# Patient Record
Sex: MALE | Race: BLACK OR AFRICAN AMERICAN | Employment: UNEMPLOYED | URBAN - METROPOLITAN AREA
[De-identification: names, ages, dates, MRNs, and addresses within clinical notes are randomized per-mention and may not be internally consistent; named-entity substitution may affect disease eponyms.]

---

## 2022-01-26 ENCOUNTER — APPOINTMENT (OUTPATIENT)
Dept: GENERAL RADIOLOGY | Age: 32
End: 2022-01-26
Attending: PHYSICIAN ASSISTANT
Payer: MEDICAID

## 2022-01-26 ENCOUNTER — HOSPITAL ENCOUNTER (EMERGENCY)
Age: 32
Discharge: HOME | End: 2022-01-27
Attending: EMERGENCY MEDICINE
Payer: MEDICAID

## 2022-01-26 VITALS
DIASTOLIC BLOOD PRESSURE: 85 MMHG | BODY MASS INDEX: 25.49 KG/M2 | OXYGEN SATURATION: 99 % | HEIGHT: 75 IN | WEIGHT: 205 LBS | RESPIRATION RATE: 20 BRPM | SYSTOLIC BLOOD PRESSURE: 132 MMHG | HEART RATE: 100 BPM | TEMPERATURE: 99 F

## 2022-01-26 DIAGNOSIS — Z87.39 HISTORY OF LOW BACK PAIN: ICD-10-CM

## 2022-01-26 DIAGNOSIS — F17.200 TOBACCO DEPENDENCE: ICD-10-CM

## 2022-01-26 DIAGNOSIS — M54.50 ACUTE BILATERAL LOW BACK PAIN WITHOUT SCIATICA: Primary | ICD-10-CM

## 2022-01-26 PROCEDURE — 74011250637 HC RX REV CODE- 250/637: Performed by: PHYSICIAN ASSISTANT

## 2022-01-26 PROCEDURE — 99283 EMERGENCY DEPT VISIT LOW MDM: CPT

## 2022-01-26 PROCEDURE — 72100 X-RAY EXAM L-S SPINE 2/3 VWS: CPT

## 2022-01-26 RX ORDER — PREDNISONE 10 MG/1
TABLET ORAL
Qty: 21 TABLET | Refills: 0 | Status: SHIPPED | OUTPATIENT
Start: 2022-01-26

## 2022-01-26 RX ORDER — MELOXICAM 15 MG/1
15 TABLET ORAL DAILY
Qty: 10 TABLET | Refills: 0 | Status: SHIPPED | OUTPATIENT
Start: 2022-01-26 | End: 2022-02-05

## 2022-01-26 RX ORDER — METHOCARBAMOL 750 MG/1
1500 TABLET, FILM COATED ORAL 3 TIMES DAILY
Qty: 24 TABLET | Refills: 0 | Status: SHIPPED | OUTPATIENT
Start: 2022-01-26 | End: 2022-01-30

## 2022-01-26 RX ORDER — OXYCODONE AND ACETAMINOPHEN 5; 325 MG/1; MG/1
1 TABLET ORAL
Status: COMPLETED | OUTPATIENT
Start: 2022-01-26 | End: 2022-01-26

## 2022-01-26 RX ORDER — OXYCODONE HYDROCHLORIDE 5 MG/1
5 TABLET ORAL
Qty: 12 TABLET | Refills: 0 | Status: SHIPPED | OUTPATIENT
Start: 2022-01-26 | End: 2022-01-29

## 2022-01-26 RX ORDER — DIAZEPAM 5 MG/1
5 TABLET ORAL
Status: COMPLETED | OUTPATIENT
Start: 2022-01-26 | End: 2022-01-26

## 2022-01-26 RX ADMIN — DIAZEPAM 5 MG: 5 TABLET ORAL at 23:49

## 2022-01-26 RX ADMIN — OXYCODONE HYDROCHLORIDE AND ACETAMINOPHEN 1 TABLET: 5; 325 TABLET ORAL at 23:49

## 2022-01-26 NOTE — Clinical Note
[address] EMERGENCY DEPT  [address] [facility] [address] [phone]    Work/School Note    Date: 1/26/2022    To Whom It May concern:    Kaila Zamora was seen and treated today in the emergency room by the following provider(s):  Attending Provider: Rinku Yang MD  Physician Assistant: Jeff Ndiaye. Kaila Zamora is excused from work/school on 1/26/2022 through 1/28/2022. He is medically clear to return to work/school on 1/29/2022.          Sincerely,          Jeff Levi

## 2022-01-27 ENCOUNTER — APPOINTMENT (OUTPATIENT)
Dept: GENERAL RADIOLOGY | Age: 32
End: 2022-01-27
Attending: PHYSICIAN ASSISTANT
Payer: MEDICAID

## 2022-01-27 PROCEDURE — 72220 X-RAY EXAM SACRUM TAILBONE: CPT

## 2022-01-27 NOTE — ED PROVIDER NOTES
EMERGENCY DEPARTMENT HISTORY AND PHYSICAL EXAM      Date: 1/26/2022  Patient Name: Jordan Luu    History of Presenting Illness     Chief Complaint   Patient presents with    Back Pain       History Provided By: Patient    HPI: Jordan Luu, 33 y.o. male presents ambulatory to the Emergency Dept with c/o low back pain since this morning. 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 He reports he has a remote h/o low back pain but has not had any issues until today. He denied rash/lesion. No dysuria/hematuria. No constipation/straining. He states the pain is right above the sacrum. He denied numbness/tingling/weakness but reportedly cannot get comfortable. He has no h/o kidney stones. He's had no incontinence of bowel/bladder. No constipation/straining. He rates his pain as 10/10 and describes it as a sharp, shooting pain. Pt is a smoker. Pt is o/w healthy without fever, chills, cough, congestion, ST, shortness of breath, chest pain, N/V/D. There are no other complaints, changes, or physical findings at this time. PCP: No primary care provider on file. Current Facility-Administered Medications   Medication Dose Route Frequency Provider Last Rate Last Admin    diazePAM (VALIUM) tablet 5 mg  5 mg Oral NOW [facility], [address]        oxyCODONE-acetaminophen (PERCOCET) 5-325 mg per tablet 1 Tablet  1 Tablet Oral NOW [facility], [address]         Current Outpatient Medications   Medication Sig Dispense Refill    oxyCODONE IR (Roxicodone) 5 mg immediate release tablet Take 1 Tablet by mouth every six (6) hours as needed for Pain for up to 3 days. Max Daily Amount: 20 mg. 12 Tablet 0    predniSONE (STERAPRED DS) 10 mg dose pack Take as directed 21 Tablet 0    methocarbamoL (ROBAXIN) 750 mg tablet Take 2 Tablets by mouth three (3) times daily for 4 days. 24 Tablet 0    meloxicam (MOBIC) 15 mg tablet Take 1 Tablet by mouth daily for 10 days.  10 Tablet 0       Past History     Past Medical History:  History reviewed. No pertinent past medical history. Past Surgical History:  History reviewed. No pertinent surgical history. Family History:  History reviewed. No pertinent family history. Social History:  Social History     Tobacco Use    Smoking status: Current Some Day Smoker     Packs/day: 0.25    Smokeless tobacco: Never Used    Tobacco comment: smokes black/milds   Substance Use Topics    Alcohol use: Not Currently    Drug use: Not Currently       Allergies:  No Known Allergies      Review of Systems   Review of Systems   Constitutional: Negative for chills and fever. HENT: Negative for congestion, rhinorrhea and sore throat. Respiratory: Negative for cough and shortness of breath. Cardiovascular: Negative for chest pain and palpitations. Gastrointestinal: Negative for abdominal pain, blood in stool, constipation, diarrhea, nausea and vomiting. Endocrine: Negative for polydipsia, polyphagia and polyuria. Genitourinary: Negative for decreased urine volume, difficulty urinating, dysuria, flank pain and hematuria. Musculoskeletal: Positive for back pain. Negative for neck pain and neck stiffness. Skin: Negative for color change, pallor, rash and wound. Allergic/Immunologic: Negative for food allergies and immunocompromised state. Neurological: Negative for dizziness, weakness, numbness and headaches. Hematological: Negative for adenopathy. Does not bruise/bleed easily. Psychiatric/Behavioral: Negative for agitation and confusion. All other systems reviewed and are negative. Physical Exam   Physical Exam  Vitals and nursing note reviewed. Constitutional:       General: He is in acute distress. Appearance: Normal appearance. He is well-developed and normal weight. He is not ill-appearing, toxic-appearing or diaphoretic. Comments: Pt sitting EOB, moaning in discomfort, alert   HENT:      Head: Normocephalic and atraumatic.       Nose: Nose normal. No congestion or rhinorrhea. Eyes:      General: No scleral icterus. Right eye: No discharge. Left eye: No discharge. Conjunctiva/sclera: Conjunctivae normal.   Neck:      Thyroid: No thyromegaly. Vascular: No JVD. Trachea: No tracheal deviation. Cardiovascular:      Rate and Rhythm: Normal rate and regular rhythm. Pulses: Normal pulses. Heart sounds: Normal heart sounds. Pulmonary:      Effort: Pulmonary effort is normal. No respiratory distress. Breath sounds: Normal breath sounds. No wheezing. Abdominal:      General: Abdomen is flat. Palpations: Abdomen is soft. Tenderness: There is no abdominal tenderness. There is no right CVA tenderness, left CVA tenderness, guarding or rebound. Musculoskeletal:         General: Tenderness present. No deformity. Cervical back: Normal range of motion and neck supple. Comments: Decreased A/P ROM to lumbosacral paraspinal musculature bilat due to tenderness with palpation and movement. No deformity noted. +SLR bilat  Pt observed to ambulate without deficit. 2+ distal pulses, NVI. Sensation grossly intact to light touch. Lymphadenopathy:      Cervical: No cervical adenopathy. Skin:     General: Skin is warm and dry. Coloration: Skin is not pale. Findings: No bruising, erythema or rash. Neurological:      General: No focal deficit present. Mental Status: He is alert and oriented to person, place, and time. Sensory: No sensory deficit. Motor: No weakness or abnormal muscle tone. Coordination: Coordination normal.   Psychiatric:         Mood and Affect: Mood normal.         Behavior: Behavior normal.         Judgment: Judgment normal.         Diagnostic Study Results     Labs -   No results found for this or any previous visit (from the past 12 hour(s)).     Radiologic Studies -   XR SPINE LUMB 2 OR 3 V    (Results Pending)   XR SACRUM AND COCCYX    (Results Pending)         Medical Decision Making   I am the first provider for this patient. I reviewed the vital signs, available nursing notes, past medical history, past surgical history, family history and social history. Vital Signs-Reviewed the patient's vital signs. Patient Vitals for the past 12 hrs:   Temp Pulse Resp BP SpO2   01/26/22 2318 99 °F (37.2 °C) 100 20 132/85 99 %           Records Reviewed: Nursing Notes, Old Medical Records, Previous Radiology Studies and Previous Laboratory Studies    Provider Notes (Medical Decision Making):   Strain, spasm, compression fx, DDD    ED Course:   Initial assessment performed. The patients presenting problems have been discussed, and they are in agreement with the care plan formulated and outlined with them. I have encouraged them to ask questions as they arise throughout their visit. TOBACCO CESSATION COUNSELING  The patient was counseled on the dangers of tobacco use, and was advised to quit. Reviewed strategies to maximize success, including written materials. Case d/w Dr. Ester Hamilton. Will obtain imaging and attempt to provide relief. PLAN:  1. Discharge Medication List as of 1/27/2022 12:37 AM      START taking these medications    Details   oxyCODONE IR (Roxicodone) 5 mg immediate release tablet Take 1 Tablet by mouth every six (6) hours as needed for Pain for up to 3 days. Max Daily Amount: 20 mg., Normal, Disp-12 Tablet, R-0      predniSONE (STERAPRED DS) 10 mg dose pack Take as directed, Normal, Disp-21 Tablet, R-0      methocarbamoL (ROBAXIN) 750 mg tablet Take 2 Tablets by mouth three (3) times daily for 4 days. , Normal, Disp-24 Tablet, R-0      meloxicam (MOBIC) 15 mg tablet Take 1 Tablet by mouth daily for 10 days. , Normal, Disp-10 Tablet, R-0           2.    Follow-up Information     Follow up With Specialties Details Why Contact Info    [address]    [place] [employer] [address] [facility]    Estela Ugarte MD Orthopedic Surgery   [address]  [address]  [address] [address] [phone]      [facility] EMERGENCY DEPT Emergency Medicine  If symptoms worsen [facility]  [phone]        Return to ED if worse     Diagnosis     Clinical Impression:   1. Acute bilateral low back pain without sciatica    2. History of low back pain    3.  Tobacco dependence

## 2022-12-04 ENCOUNTER — HOSPITAL ENCOUNTER (EMERGENCY)
Age: 32
Discharge: HOME | End: 2022-12-04
Attending: STUDENT IN AN ORGANIZED HEALTH CARE EDUCATION/TRAINING PROGRAM
Payer: MEDICAID

## 2022-12-04 VITALS
TEMPERATURE: 97.5 F | DIASTOLIC BLOOD PRESSURE: 94 MMHG | BODY MASS INDEX: 24.86 KG/M2 | SYSTOLIC BLOOD PRESSURE: 140 MMHG | HEART RATE: 69 BPM | OXYGEN SATURATION: 100 % | RESPIRATION RATE: 18 BRPM | HEIGHT: 75 IN | WEIGHT: 199.96 LBS

## 2022-12-04 DIAGNOSIS — Z20.828 EXPOSURE TO THE FLU: ICD-10-CM

## 2022-12-04 DIAGNOSIS — B34.9 VIRAL SYNDROME: ICD-10-CM

## 2022-12-04 DIAGNOSIS — R09.81 SINUS CONGESTION: Primary | ICD-10-CM

## 2022-12-04 LAB
COVID-19 RAPID TEST, COVR: NOT DETECTED
FLUAV AG NPH QL IA: NEGATIVE
FLUBV AG NOSE QL IA: NEGATIVE
SOURCE, COVRS: NORMAL

## 2022-12-04 PROCEDURE — 99283 EMERGENCY DEPT VISIT LOW MDM: CPT

## 2022-12-04 PROCEDURE — 87804 INFLUENZA ASSAY W/OPTIC: CPT

## 2022-12-04 PROCEDURE — 87635 SARS-COV-2 COVID-19 AMP PRB: CPT

## 2022-12-04 RX ORDER — ONDANSETRON 4 MG/1
4 TABLET, FILM COATED ORAL
Qty: 20 TABLET | Refills: 0 | Status: SHIPPED | OUTPATIENT
Start: 2022-12-04

## 2022-12-04 RX ORDER — ACETAMINOPHEN 325 MG/1
650 TABLET ORAL
Qty: 30 TABLET | Refills: 0 | Status: SHIPPED | OUTPATIENT
Start: 2022-12-04

## 2022-12-04 RX ORDER — IBUPROFEN 400 MG/1
400 TABLET ORAL
Qty: 30 TABLET | Refills: 0 | Status: SHIPPED | OUTPATIENT
Start: 2022-12-04

## 2022-12-04 NOTE — Clinical Note
[address]  [facility] EMERGENCY DEPT  [address] [facility]  [place] [address]  [phone]    Work/School Note    Date: 12/4/2022     To Whom It May concern:    Dung Aragon was evaluated by the following provider(s):  Attending Provider: Lauren Barnes MD  Physician Assistant: Eileen Saeed, [address] virus is suspected. Per the CDC guidelines we recommend home isolation until the following conditions are all met:    1. At least five days have passed since symptoms first appeared and/or had a close exposure,   2. After home isolation for five days, wearing a mask around others for the next five days,  3. At least 24 have passed since last fever without the use of fever-reducing medications and  4.  Symptoms (eg cough, shortness of breath) have improved      Sincerely,          ERIKA Boudreaux

## 2022-12-04 NOTE — Clinical Note
[address]  [facility] EMERGENCY DEPT  [address] [address] [phone]    Work/School Note    Date: 12/4/2022    To Whom It May concern:    Luz Maria Bradley was seen and treated today in the emergency room by the following provider(s):  Attending Provider: Latricia Roberts MD  Physician Assistant: Jeff Urena. Luz Maria Bradley is excused from work/school on 12/4/2022 through 12/6/2022. He is medically clear to return to work/school on 12/7/2022.          Sincerely,          Suzy Kawasaki, PA

## 2022-12-05 NOTE — DISCHARGE INSTRUCTIONS
Thank You! It was a pleasure taking care of you in our Emergency Department today. We know that when you come to [address] [place] [address], you are entrusting us with your health, comfort, and safety. Our clinicians honor that trust, and truly appreciate the opportunity to care for you and your loved ones. We also value your feedback. If you receive a survey about your Emergency Department experience today, please fill it out. We care about our patients' feedback, and we listen to what you have to say. Thank you. Nidia Zee PA-C    __________________________________________________________  I have included a copy of your lab results and/or radiologic studies from today's visit so you can have them easily available at your follow-up visit. We hope you feel better and please do not hesitate to contact the ED if you have any questions at all! Recent Results (from the past 12 hour(s))   COVID-19 RAPID TEST    Collection Time: 12/04/22  8:26 PM   Result Value Ref Range    Specimen source Nasopharyngeal      COVID-19 rapid test Not detected NOTD     INFLUENZA A+B VIRAL AGS    Collection Time: 12/04/22  8:30 PM   Result Value Ref Range    Influenza A Antigen Negative NEG      Influenza B Antigen Negative NEG         No orders to display     CT Results  (Last 48 hours)      None          The exam and treatment you received in the Emergency Department were for an urgent problem and are not intended as complete care. It is important that you follow up with a doctor, nurse practitioner, or physician assistant for ongoing care. If your symptoms become worse or you do not improve as expected and you are unable to reach your usual health care provider, you should return to the Emergency Department. We are available 24 hours a day. Please take your discharge instructions with you when you go to your follow-up appointment.      If a prescription has been provided, please have it filled as soon as possible to prevent a delay in treatment. Read the entire medication instruction sheet provided to you by the pharmacy. If you have any questions or reservations about taking the medication due to side effects or interactions with other medications, please call your primary care physician or contact the ER to speak with the charge nurse. Please make an appointment with your family doctor or the physician you were referred to for follow-up of this visit as instructed on your discharge paperwork. Return to the ER if you are unable to be seen or if you are unable to be seen in a timely manner. If you have any problem arranging the follow-up visit, contact the Emergency Department immediately.

## 2022-12-05 NOTE — ED PROVIDER NOTES
EMERGENCY DEPARTMENT HISTORY AND PHYSICAL EXAM      Date: 12/4/2022  Patient Name: Daisy Zelaya    History of Presenting Illness     Chief Complaint   Patient presents with    Fever     Pt reports fever, chills, nasal congestion x 4 days, is not vaccinated, no tylenol today. Reports pos flu contact. History Provided By: Patient    HPI: Daisy Zelaya, 27 y.o. male with PMHx HTN, per patient and record review, presents  to the ED with cc of mild, intermittent cough, sinus congestion, chills, body aches and malaise the past 3-4 days. Endorses subjective fevers and chills, has been taking Tylenol, but no medications today. States he has had intermittent episodes of nausea and has had a few episodes of nonbilious, nonbloody vomiting the past few days, but denies any vomiting today. Denies nausea at this time. Denies associated abdominal pain. Endorses mild, intermittent cough. Has had intermittent SOB with coughing, denies wheezing. Endorses decreased appetite, but tolerating PO well. Patient's significant other noted that her daughter was positive for Flu. Denies neck pain or stiffness, ST, abdominal pain, blood in urine or stool, rashes or LOC. No additional exacerbating or alleviating factors. No other complaints at this time. Patient is unvaccinated. There are no other complaints, changes, or physical findings at this time. PCP: None      Past History     Past Medical History:  Past Medical History:   Diagnosis Date    Asthma        Past Surgical History:  History reviewed. No pertinent surgical history.     Family History:  Family History   Problem Relation Age of Onset    Hypertension Father        Social History:  Social History     Tobacco Use    Smoking status: Some Days     Packs/day: 0.25     Types: Cigarettes    Smokeless tobacco: Never    Tobacco comments:     smokes black/milds   Vaping Use    Vaping Use: Never used   Substance Use Topics    Alcohol use: Not Currently     Comment: socially    Drug use: Not Currently       Allergies:  No Known Allergies  Review of Systems   Review of Systems   Constitutional:  Positive for chills and fever. Negative for appetite change. HENT:  Positive for congestion, rhinorrhea, sinus pressure and sinus pain. Eyes:  Negative for pain. Respiratory:  Positive for cough. Negative for shortness of breath and wheezing. Cardiovascular:  Negative for chest pain. Gastrointestinal:  Positive for nausea and vomiting. Negative for abdominal pain, constipation and diarrhea. Genitourinary:  Negative for decreased urine volume, difficulty urinating, dysuria, frequency and urgency. Musculoskeletal:  Positive for myalgias. Negative for back pain, gait problem, neck pain and neck stiffness. Skin:  Negative for rash. Neurological:  Negative for syncope, numbness and headaches. Psychiatric/Behavioral:  Negative for self-injury. All other systems reviewed and are negative. Physical Exam   Physical Exam  Vitals and nursing note reviewed. Constitutional:       General: He is not in acute distress. Appearance: Normal appearance. He is not ill-appearing or toxic-appearing. Comments: 27 y.o. male   HENT:      Head: Normocephalic and atraumatic. Right Ear: External ear normal.      Left Ear: External ear normal.      Nose: Congestion present. Mouth/Throat:      Mouth: Mucous membranes are moist.      Pharynx: Oropharynx is clear. Eyes:      Extraocular Movements: Extraocular movements intact. Conjunctiva/sclera: Conjunctivae normal.   Cardiovascular:      Rate and Rhythm: Normal rate and regular rhythm. Pulses: Normal pulses. Heart sounds: Normal heart sounds. No murmur heard. Pulmonary:      Effort: Pulmonary effort is normal. No respiratory distress. Breath sounds: Normal breath sounds. No wheezing. Abdominal:      General: Abdomen is flat. There is no distension. Tenderness:  There is no abdominal tenderness. Musculoskeletal:         General: Normal range of motion. Cervical back: Normal range of motion. Skin:     General: Skin is warm and dry. Neurological:      General: No focal deficit present. Mental Status: He is alert and oriented to person, place, and time. Psychiatric:         Mood and Affect: Mood normal.         Behavior: Behavior normal.     Diagnostic Study Results     Labs -     Recent Results (from the past 12 hour(s))   COVID-19 RAPID TEST    Collection Time: 12/04/22  8:26 PM   Result Value Ref Range    Specimen source Nasopharyngeal      COVID-19 rapid test Not detected NOTD     INFLUENZA A+B VIRAL AGS    Collection Time: 12/04/22  8:30 PM   Result Value Ref Range    Influenza A Antigen Negative NEG      Influenza B Antigen Negative NEG         Radiologic Studies -   No orders to display     CT Results  (Last 48 hours)      None          CXR Results  (Last 48 hours)      None          Medical Decision Making   I am the first provider for this patient. I reviewed the vital signs, available nursing notes, past medical history, past surgical history, family history and social history. Vital Signs-Reviewed the patient's vital signs. Patient Vitals for the past 12 hrs:   Temp Pulse Resp BP SpO2   12/04/22 2020 97.5 °F (36.4 °C) 69 18 (!) 140/94 100 %       Pulse Oximetry Analysis - 100% on RA    Records Reviewed: Nursing Notes and Old Medical Records    Provider Notes (Medical Decision Making):   Patient is a pleasant well-appearing 49-year-old male who presents ED for evaluation of cough, congestion, remittent subjective fevers and body aches with additional history as noted above for the past few days. Afebrile, nontoxic-appearing. No hypoxia or increased work of breathing, breath sounds clear throughout. Although flu and COVID-negative, patient does have a very close contact with someone with flu. History and physical exam consistent with viral etiology.   No evidence of emergent conditions requiring further evaluation/management acutely here at this time. Shared decision making performed and care plan created together, discussed results, diagnosis and treatment plan. Provided prescription for Zofran, Motrin and Tylenol. Counseled additional symptomatic management techniques. PCP follow-up. Verbal return precautions advised. Patient verbalizes understanding and agreement of current plan of care. ED Course:   Initial assessment performed. The patients presenting problems have been discussed, and they are in agreement with the care plan formulated and outlined with them. I have encouraged them to ask questions as they arise throughout their visit. Disposition:  Discharge     PLAN:  1. Discharge Medication List as of 12/4/2022  9:55 PM        START taking these medications    Details   ibuprofen (MOTRIN) 400 mg tablet Take 1 Tablet by mouth every six (6) hours as needed for Pain., Normal, Disp-30 Tablet, R-0      acetaminophen (TYLENOL) 325 mg tablet Take 2 Tablets by mouth every six (6) hours as needed for Pain or Fever., Normal, Disp-30 Tablet, R-0      ondansetron hcl (Zofran) 4 mg tablet Take 1 Tablet by mouth every eight (8) hours as needed for Nausea or Vomiting., Normal, Disp-20 Tablet, R-0             2.   Follow-up Information       Follow up With Specialties Details Why Contact Info    [facility] EMERGENCY DEPT Emergency Medicine  As needed, If symptoms worsen [address] [place] [address]  [phone]    [facility] [address] OFFICE  In 1 week  [address] [address] [phone]          Return to ED if worse     Diagnosis     Clinical Impression:   1. Sinus congestion    2. Viral syndrome    3.  Exposure to the flu

## 2022-12-05 NOTE — ED NOTES
All specimens sent  to lab from triage    Patient resting on stretcher. Awaiting provider, results and disposition.

## 2023-05-25 RX ORDER — IBUPROFEN 400 MG/1
400 TABLET ORAL EVERY 6 HOURS PRN
Status: ON HOLD | COMMUNITY
Start: 2022-12-04 | End: 2023-06-13 | Stop reason: HOSPADM

## 2023-05-25 RX ORDER — ONDANSETRON 4 MG/1
4 TABLET, FILM COATED ORAL EVERY 8 HOURS PRN
COMMUNITY
Start: 2022-12-04 | End: 2023-06-11

## 2023-05-25 RX ORDER — ACETAMINOPHEN 325 MG/1
650 TABLET ORAL EVERY 6 HOURS PRN
COMMUNITY
Start: 2022-12-04

## 2023-06-11 PROBLEM — K52.9 COLITIS: Status: ACTIVE | Noted: 2023-06-11

## 2023-06-13 PROBLEM — K52.9 COLITIS: Status: RESOLVED | Noted: 2023-06-11 | Resolved: 2023-06-13

## 2023-11-10 ENCOUNTER — ANESTHESIA (OUTPATIENT)
Facility: HOSPITAL | Age: 33
End: 2023-11-10

## 2023-11-10 ENCOUNTER — HOSPITAL ENCOUNTER (OUTPATIENT)
Facility: HOSPITAL | Age: 33
Setting detail: OUTPATIENT SURGERY
Discharge: HOME | End: 2023-11-10
Attending: INTERNAL MEDICINE | Admitting: INTERNAL MEDICINE

## 2023-11-10 ENCOUNTER — ANESTHESIA EVENT (OUTPATIENT)
Facility: HOSPITAL | Age: 33
End: 2023-11-10

## 2023-11-10 VITALS
SYSTOLIC BLOOD PRESSURE: 120 MMHG | RESPIRATION RATE: 13 BRPM | DIASTOLIC BLOOD PRESSURE: 83 MMHG | OXYGEN SATURATION: 100 % | BODY MASS INDEX: 28.23 KG/M2 | HEART RATE: 72 BPM | WEIGHT: 220 LBS | TEMPERATURE: 98 F | HEIGHT: 74 IN

## 2023-11-10 PROCEDURE — 7100000011 HC PHASE II RECOVERY - ADDTL 15 MIN: Performed by: INTERNAL MEDICINE

## 2023-11-10 PROCEDURE — 6360000002 HC RX W HCPCS: Performed by: NURSE ANESTHETIST, CERTIFIED REGISTERED

## 2023-11-10 PROCEDURE — 3700000001 HC ADD 15 MINUTES (ANESTHESIA): Performed by: INTERNAL MEDICINE

## 2023-11-10 PROCEDURE — 3600007512: Performed by: INTERNAL MEDICINE

## 2023-11-10 PROCEDURE — 3600007502: Performed by: INTERNAL MEDICINE

## 2023-11-10 PROCEDURE — 88305 TISSUE EXAM BY PATHOLOGIST: CPT

## 2023-11-10 PROCEDURE — 2709999900 HC NON-CHARGEABLE SUPPLY: Performed by: INTERNAL MEDICINE

## 2023-11-10 PROCEDURE — 7100000010 HC PHASE II RECOVERY - FIRST 15 MIN: Performed by: INTERNAL MEDICINE

## 2023-11-10 PROCEDURE — 2580000003 HC RX 258: Performed by: NURSE ANESTHETIST, CERTIFIED REGISTERED

## 2023-11-10 PROCEDURE — 2500000003 HC RX 250 WO HCPCS: Performed by: NURSE ANESTHETIST, CERTIFIED REGISTERED

## 2023-11-10 PROCEDURE — 3700000000 HC ANESTHESIA ATTENDED CARE: Performed by: INTERNAL MEDICINE

## 2023-11-10 RX ORDER — PHENYLEPHRINE HCL IN 0.9% NACL 0.4MG/10ML
SYRINGE (ML) INTRAVENOUS PRN
Status: DISCONTINUED | OUTPATIENT
Start: 2023-11-10 | End: 2023-11-10 | Stop reason: SDUPTHER

## 2023-11-10 RX ORDER — SODIUM CHLORIDE 0.9 % (FLUSH) 0.9 %
5-40 SYRINGE (ML) INJECTION EVERY 12 HOURS SCHEDULED
Status: CANCELLED | OUTPATIENT
Start: 2023-11-10

## 2023-11-10 RX ORDER — SODIUM CHLORIDE 9 MG/ML
INJECTION, SOLUTION INTRAVENOUS CONTINUOUS PRN
Status: DISCONTINUED | OUTPATIENT
Start: 2023-11-10 | End: 2023-11-10 | Stop reason: SDUPTHER

## 2023-11-10 RX ORDER — LIDOCAINE HYDROCHLORIDE 20 MG/ML
INJECTION, SOLUTION EPIDURAL; INFILTRATION; INTRACAUDAL; PERINEURAL PRN
Status: DISCONTINUED | OUTPATIENT
Start: 2023-11-10 | End: 2023-11-10 | Stop reason: SDUPTHER

## 2023-11-10 RX ORDER — SODIUM CHLORIDE 0.9 % (FLUSH) 0.9 %
5-40 SYRINGE (ML) INJECTION PRN
Status: CANCELLED | OUTPATIENT
Start: 2023-11-10

## 2023-11-10 RX ORDER — SODIUM CHLORIDE 9 MG/ML
25 INJECTION, SOLUTION INTRAVENOUS PRN
Status: CANCELLED | OUTPATIENT
Start: 2023-11-10

## 2023-11-10 RX ORDER — SODIUM CHLORIDE 9 MG/ML
INJECTION, SOLUTION INTRAVENOUS CONTINUOUS
Status: CANCELLED | OUTPATIENT
Start: 2023-11-10

## 2023-11-10 RX ADMIN — PROPOFOL 25 MG: 10 INJECTION, EMULSION INTRAVENOUS at 11:56

## 2023-11-10 RX ADMIN — Medication 40 MCG: at 12:05

## 2023-11-10 RX ADMIN — PROPOFOL 150 MG: 10 INJECTION, EMULSION INTRAVENOUS at 11:46

## 2023-11-10 RX ADMIN — PROPOFOL 25 MG: 10 INJECTION, EMULSION INTRAVENOUS at 11:49

## 2023-11-10 RX ADMIN — PROPOFOL 25 MG: 10 INJECTION, EMULSION INTRAVENOUS at 11:51

## 2023-11-10 RX ADMIN — PROPOFOL 50 MG: 10 INJECTION, EMULSION INTRAVENOUS at 11:47

## 2023-11-10 RX ADMIN — Medication 40 MCG: at 12:06

## 2023-11-10 RX ADMIN — SODIUM CHLORIDE: 9 INJECTION, SOLUTION INTRAVENOUS at 11:42

## 2023-11-10 RX ADMIN — PROPOFOL 25 MG: 10 INJECTION, EMULSION INTRAVENOUS at 11:53

## 2023-11-10 RX ADMIN — LIDOCAINE HYDROCHLORIDE 60 MG: 20 INJECTION, SOLUTION EPIDURAL; INFILTRATION; INTRACAUDAL; PERINEURAL at 11:46

## 2023-11-10 ASSESSMENT — PAIN - FUNCTIONAL ASSESSMENT: PAIN_FUNCTIONAL_ASSESSMENT: 0-10

## 2023-11-10 NOTE — ANESTHESIA PRE PROCEDURE
06/13/2023 06:09 AM    MCV 83.8 06/13/2023 06:09 AM    RDW 13.8 06/13/2023 06:09 AM     06/13/2023 06:09 AM       CMP:   Lab Results   Component Value Date/Time     06/13/2023 06:09 AM    K 3.5 06/13/2023 06:09 AM     06/13/2023 06:09 AM    CO2 27 06/13/2023 06:09 AM    BUN 5 06/13/2023 06:09 AM    CREATININE 0.87 06/13/2023 06:09 AM    LABGLOM >60 06/13/2023 06:09 AM    GLUCOSE 105 06/13/2023 06:09 AM    PROT 5.5 06/12/2023 04:12 AM    CALCIUM 8.3 06/13/2023 06:09 AM    BILITOT 0.2 06/12/2023 04:12 AM    ALKPHOS 43 06/12/2023 04:12 AM    AST 16 06/12/2023 04:12 AM    ALT 20 06/12/2023 04:12 AM       POC Tests: No results for input(s): \"POCGLU\", \"POCNA\", \"POCK\", \"POCCL\", \"POCBUN\", \"POCHEMO\", \"POCHCT\" in the last 72 hours.     Coags:   Lab Results   Component Value Date/Time    PROTIME 10.6 06/12/2023 04:12 AM    INR 1.0 06/12/2023 04:12 AM    APTT 29.8 06/12/2023 04:12 AM       HCG (If Applicable): No results found for: \"PREGTESTUR\", \"PREGSERUM\", \"HCG\", \"HCGQUANT\"     ABGs: No results found for: \"PHART\", \"PO2ART\", \"LQI4VIK\", \"WXS7OXV\", \"BEART\", \"R3HACJDI\"     Type & Screen (If Applicable):  No results found for: \"LABABO\", \"LABRH\"    Drug/Infectious Status (If Applicable):  No results found for: \"HIV\", \"HEPCAB\"    COVID-19 Screening (If Applicable):   Lab Results   Component Value Date/Time    COVID19 Not detected 12/04/2022 08:26 PM           Anesthesia Evaluation  Patient summary reviewed  Airway: Mallampati: II     Neck ROM: full  Mouth opening: > = 3 FB   Dental: normal exam         Pulmonary:Negative Pulmonary ROS and normal exam  breath sounds clear to auscultation  (+) asthma:                            Cardiovascular:Negative CV ROS  Exercise tolerance: good (>4 METS),                     Neuro/Psych:   Negative Neuro/Psych ROS              GI/Hepatic/Renal: Neg GI/Hepatic/Renal ROS            Endo/Other: Negative Endo/Other ROS                    Abdominal: normal exam            Vascular:

## 2023-11-10 NOTE — PERIOP NOTE

## 2023-11-10 NOTE — ANESTHESIA POSTPROCEDURE EVALUATION
Department of Anesthesiology  Postprocedure Note    Patient: Lari Goodpasture  MRN: [id number]  YOB: 1990  Date of evaluation: 11/10/2023      Procedure Summary     Date: 11/10/23 Room / Location: [facility] ENDO 03 / [facility] ENDOSCOPY    Anesthesia Start: 1142 Anesthesia Stop: 9365    Procedure: COLONOSCOPY WITH BIOPSY/POLYP (Lower GI Region) Diagnosis:       Chronic colitis      (Chronic colitis [K52.9])    Surgeons: Balaji Platt MD Responsible Provider: Cece Marsh DO    Anesthesia Type: MAC ASA Status: 2          Anesthesia Type: MAC    Miranda Phase I: Miranda Score: 10    Miranda Phase II: Miranda Score: 10      Anesthesia Post Evaluation    Patient location during evaluation: PACU  Level of consciousness: awake  Airway patency: patent  Nausea & Vomiting: no nausea  Complications: no  Cardiovascular status: hemodynamically stable  Respiratory status: acceptable  Hydration status: stable  Multimodal analgesia pain management approach  Pain management: adequate

## 2023-11-10 NOTE — OP NOTE
[facility]  [address], [address] [facility]  [phone]               Colonoscopy Operative Report      Indications: Recent colitis    :  Joseph Hebert MD    Staff: Circulator: Darek Woodall RN     Referring Provider: No primary care provider on file. Sedation:  MAC anesthesia    Procedure Details:  After informed consent was obtained with all risks and benefits of procedure explained and preoperative exam completed, the patient was taken to the endoscopy suite and placed in the left lateral decubitus position. Upon sequential sedation as per above, a digital rectal exam was performed  And was normal.  The Olympus videocolonoscope  was inserted in the rectum and carefully advanced to the cecum and terminal ileum, which was identified by the ileocecal valve and appendiceal orifice. The quality of preparation was poor - fair visualization obtained with extensive lavage. The colonoscope was slowly withdrawn with careful evaluation between folds. Retroflexion in the rectum was performed and was normal..     Findings:   Rectum: normal  Sigmoid: normal  Descending Colon: normal  Transverse Colon: normal  Ascending Colon: 1  Sessile polyp(s), the largest 3 mm in size;  Cecum: normal  Terminal Ileum: normal    Interventions:  1 complete polypectomy were performed using cold biopsy forceps and the polyps were  retrieved    Specimen Removed:   ID Type Source Tests Collected by Time Destination   1 : Ascending colon polyp Tissue Colon-Ascending SURGICAL PATHOLOGY Joseph Hebert MD 11/10/2023 1157        EBL:  Minimal    Complications:  None; patient tolerated the procedure well. Impression:  -Single small, 3 mm, polyp found in the proximal ascending colon - removed and sent for pathology  -Otherwise normal colon and terminal ileum without evidence of inflammation. Recent colitis likely secondary to infection. Recommendations:   -Resume normal medication(s).   -Resume

## 2023-12-03 NOTE — ED NOTES
Pt is AOx4, respirations are even and unlabored. Skin is warm and dry. Pt to ED for lower back pain that shoots down L lower leg since last night. Pt reports lower back tightness at 4am. Pt states he took Tylenol at 6am, and Ibuprofen at 10am without relief. 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 Bed in lowest locked position. Call bell within reach. Emergency Department Nursing Plan of Care       The Nursing Plan of Care is developed from the Nursing assessment and Emergency Department Attending provider initial evaluation. The plan of care may be reviewed in the ED Provider note.     The Plan of Care was developed with the following considerations:   Patient / Family readiness to learn indicated by:verbalized understanding  Persons(s) to be included in education: patient  Barriers to Learning/Limitations:No    Signed     Sandra Anguiano RN    1/26/2022   11:38 PM Left LE

## 2024-11-08 ENCOUNTER — OFFICE VISIT (OUTPATIENT)
Facility: CLINIC | Age: 34
End: 2024-11-08
Payer: MEDICAID

## 2024-11-08 VITALS
OXYGEN SATURATION: 97 % | WEIGHT: 189 LBS | BODY MASS INDEX: 23.5 KG/M2 | SYSTOLIC BLOOD PRESSURE: 127 MMHG | DIASTOLIC BLOOD PRESSURE: 81 MMHG | TEMPERATURE: 98.5 F | HEIGHT: 75 IN | HEART RATE: 92 BPM

## 2024-11-08 DIAGNOSIS — Z76.89 ENCOUNTER TO ESTABLISH CARE: ICD-10-CM

## 2024-11-08 DIAGNOSIS — F17.200 TOBACCO USE DISORDER: ICD-10-CM

## 2024-11-08 DIAGNOSIS — Z11.3 SCREEN FOR STD (SEXUALLY TRANSMITTED DISEASE): ICD-10-CM

## 2024-11-08 DIAGNOSIS — R76.8 POSITIVE HEPATITIS C ANTIBODY TEST: ICD-10-CM

## 2024-11-08 DIAGNOSIS — R76.8 POSITIVE HEPATITIS C ANTIBODY TEST: Primary | ICD-10-CM

## 2024-11-08 DIAGNOSIS — Z28.39 IMMUNIZATIONS INCOMPLETE: ICD-10-CM

## 2024-11-08 PROCEDURE — 99203 OFFICE O/P NEW LOW 30 MIN: CPT | Performed by: STUDENT IN AN ORGANIZED HEALTH CARE EDUCATION/TRAINING PROGRAM

## 2024-11-08 RX ORDER — DIPHENHYDRAMINE HCL 25 MG
25 TABLET ORAL NIGHTLY PRN
COMMUNITY

## 2024-11-08 RX ORDER — NICOTINE 21 MG/24HR
1 PATCH, TRANSDERMAL 24 HOURS TRANSDERMAL DAILY
Qty: 42 PATCH | Refills: 0 | Status: SHIPPED | OUTPATIENT
Start: 2024-11-08 | End: 2024-12-20

## 2024-11-08 ASSESSMENT — PATIENT HEALTH QUESTIONNAIRE - PHQ9
SUM OF ALL RESPONSES TO PHQ QUESTIONS 1-9: 0
1. LITTLE INTEREST OR PLEASURE IN DOING THINGS: NOT AT ALL
2. FEELING DOWN, DEPRESSED OR HOPELESS: NOT AT ALL
SUM OF ALL RESPONSES TO PHQ9 QUESTIONS 1 & 2: 0

## 2024-11-08 ASSESSMENT — ENCOUNTER SYMPTOMS
DIARRHEA: 0
COUGH: 0
WHEEZING: 0
TROUBLE SWALLOWING: 0
ABDOMINAL PAIN: 0
CONSTIPATION: 0
NAUSEA: 0
ABDOMINAL DISTENTION: 0
VOMITING: 0
SHORTNESS OF BREATH: 0

## 2024-11-08 NOTE — PROGRESS NOTES
Chief Complaint   Patient presents with    New Patient    /81 (Site: Right Upper Arm, Position: Sitting, Cuff Size: Medium Adult)   Pulse 92   Temp 98.5 °F (36.9 °C) (Oral)   Ht 1.905 m (6' 3\")   Wt 85.7 kg (189 lb)   SpO2 97%   BMI 23.62 kg/m²  1. Have you been to the ER, urgent care clinic since your last visit?  Hospitalized since your last visit?Yes Where: [facility]    2. Have you seen or consulted any other health care providers outside of the [facility] since your last visit?  Include any pap smears or colon screening. Yes Where: [facility]    
OR 3 V, XR SACRUM AND COCCYX    INDICATION: Low back pain and sacrum pain.    COMPARISON: None.    FINDINGS:  3 views lumbar spine. There is no acute fracture or subluxation. Vertebral body  heights are maintained. There is mild intervertebral disc space narrowing at  L5-S1. There is no abnormality in alignment. The sacroiliac joints are  unremarkable.    3 views sacrum/coccyx. There is no acute fracture or dislocation. The sacroiliac  and hip joint spaces are maintained.    Impression  No acute abnormality in the lumbar spine or sacrum/coccyx. Mild degenerative  disc change at L5-S1.     ECHO Results (most recent):  No results found for this or any previous visit.     Current Outpatient Medications   Medication Sig    diphenhydrAMINE (BENADRYL) 25 MG tablet Take 1 tablet by mouth nightly as needed for Itching    nicotine (NICODERM CQ) 21 MG/24HR Place 1 patch onto the skin daily    albuterol sulfate HFA (VENTOLIN HFA) 108 (90 Base) MCG/ACT inhaler Inhale 2 puffs into the lungs every 6 hours as needed for Wheezing    acetaminophen (TYLENOL) 325 MG tablet Take 2 tablets by mouth every 6 hours as needed     No current facility-administered medications for this visit.       No Known Allergies     Past Medical History:   Diagnosis Date    Asthma         Family History   Problem Relation Age of Onset    Hypertension Father         Past Surgical History:   Procedure Laterality Date    COLONOSCOPY N/A 11/10/2023    COLONOSCOPY WITH BIOPSY/POLYP performed by Alice Otero MD at [facility] ENDOSCOPY       Social History     Tobacco Use    Smoking status: Some Days     Types: Cigars    Smokeless tobacco: Never   Substance Use Topics    Alcohol use: Yes    Drug use: Not Currently         Review of Systems   Constitutional:  Negative for chills and fever.   HENT:  Negative for congestion and trouble swallowing.    Eyes:  Negative for visual disturbance.   Respiratory:  Negative for cough, shortness of breath and wheezing.

## 2024-11-18 DIAGNOSIS — R76.8 HEPATITIS C ANTIBODY TEST POSITIVE: Primary | ICD-10-CM

## 2024-11-19 ENCOUNTER — CLINICAL DOCUMENTATION (OUTPATIENT)
Age: 34
End: 2024-11-19

## 2024-11-19 NOTE — PROGRESS NOTES
11/19/24 1028am: New pt referral received. Referred by Angelica Narayanan MD at [facility]. Dx: Hep C. Records in [facility]. Routine appt

## 2024-12-08 PROBLEM — Z11.3 SCREEN FOR STD (SEXUALLY TRANSMITTED DISEASE): Status: RESOLVED | Noted: 2024-11-08 | Resolved: 2024-12-08

## 2025-01-27 ENCOUNTER — OFFICE VISIT (OUTPATIENT)
Facility: CLINIC | Age: 35
End: 2025-01-27
Payer: MEDICAID

## 2025-01-27 VITALS
TEMPERATURE: 98.2 F | HEIGHT: 75 IN | BODY MASS INDEX: 23.25 KG/M2 | WEIGHT: 187 LBS | SYSTOLIC BLOOD PRESSURE: 119 MMHG | HEART RATE: 73 BPM | OXYGEN SATURATION: 98 % | DIASTOLIC BLOOD PRESSURE: 83 MMHG

## 2025-01-27 DIAGNOSIS — Z01.89 ENCOUNTER FOR ROUTINE LABORATORY TESTING: ICD-10-CM

## 2025-01-27 DIAGNOSIS — R09.81 NASAL CONGESTION: ICD-10-CM

## 2025-01-27 DIAGNOSIS — K52.9 GASTROENTERITIS: Primary | ICD-10-CM

## 2025-01-27 DIAGNOSIS — J00 COMMON COLD: ICD-10-CM

## 2025-01-27 DIAGNOSIS — R76.8 POSITIVE HEPATITIS C ANTIBODY TEST: ICD-10-CM

## 2025-01-27 DIAGNOSIS — Z28.39 IMMUNIZATIONS INCOMPLETE: ICD-10-CM

## 2025-01-27 LAB
INFLUENZA A ANTIGEN, POC: NORMAL
INFLUENZA B ANTIGEN, POC: NORMAL
LOT EXPIRE DATE: NORMAL
LOT KIT NUMBER: NORMAL
SARS-COV-2, POC: NORMAL
VALID INTERNAL CONTROL: NORMAL
VENDOR AND KIT NAME POC: NORMAL

## 2025-01-27 PROCEDURE — 87804 INFLUENZA ASSAY W/OPTIC: CPT | Performed by: STUDENT IN AN ORGANIZED HEALTH CARE EDUCATION/TRAINING PROGRAM

## 2025-01-27 PROCEDURE — 99214 OFFICE O/P EST MOD 30 MIN: CPT | Performed by: STUDENT IN AN ORGANIZED HEALTH CARE EDUCATION/TRAINING PROGRAM

## 2025-01-27 PROCEDURE — 87426 SARSCOV CORONAVIRUS AG IA: CPT | Performed by: STUDENT IN AN ORGANIZED HEALTH CARE EDUCATION/TRAINING PROGRAM

## 2025-01-27 RX ORDER — FLUTICASONE PROPIONATE 50 MCG
1 SPRAY, SUSPENSION (ML) NASAL DAILY
Qty: 16 G | Refills: 2 | Status: SHIPPED | OUTPATIENT
Start: 2025-01-27

## 2025-01-27 ASSESSMENT — ENCOUNTER SYMPTOMS
TROUBLE SWALLOWING: 0
CONSTIPATION: 0
DIARRHEA: 1
SHORTNESS OF BREATH: 0
NAUSEA: 0
WHEEZING: 0
COUGH: 0
ABDOMINAL PAIN: 0
RHINORRHEA: 1
VOMITING: 0
ABDOMINAL DISTENTION: 0

## 2025-01-27 NOTE — PROGRESS NOTES
[facility]  [address] [place], [place] [address]  Phone: [phone]      Jeancarlos Herrera (: 1990) is a 34 y.o. male, established patient with history of mild asthma, seasonal allergies , hepatis C antibody here for follow up   Follow-up Chronic Condition, Congestion, and Fever (diarrhea)   Primary complaint is for past 3 days has had nasal congestion, runny nose and diarrhea non bloody  Denies any fevers,chills, cough, headaches, chest pains,sob, nausea, vomiting, abd pain,urinary changes  Has been taking dayquil    Patient did go to [facility] for hepatitis C positive antibody 2024  Alin Cooper NP  Per chart review:   -Plan: Hep C Ab, PCR RNA, and GT, HIV, Hep A and B serologies  -If HCV Ab and PCR RNA negative, no further testing needed : 2024 HCA RNA not detectable, Hep C antibody  positive  -If HCV Ab positive and PCR RNA negative, repeat HCV PCR RNA in 3-6 months.  -If HCV Ab positive and PCR RNA positive, will proceed with with DAA therapy for HCV.   -Fibroscan shows F0-2 fibrosis, FIB-4 score < 1.3 is low. No evidence of cirrhosis on CT in .  -## Hepatic Steatosis:  -Noted on CT in  but not on Fibroscan.  Treatment of fatty liver disease, and that it will involve a multi-faceted approach of gradual weight loss through diet and exercise, and optimal management of cholesterol, BP and diabetes when applicable    Immunizations: hepatitis A and hepatitis B   2024 considered not immune to hepatitis B : has not completed hep A or B yet   Pneumococcal vaccine: not on file  TdaP: not on file      No results found for: \"LABA1C\"   Hemoglobin   Date Value Ref Range Status   2023 12.0 (L) 12.1 - 17.0 g/dL Final     Hematocrit   Date Value Ref Range Status   2023 38.3 36.6 - 50.3 % Final     Creatinine   Date Value Ref Range Status   2023 0.87 0.70 - 1.30 MG/DL Final     Glucose   Date Value Ref Range Status   2023 105 (H) 65

## 2025-01-27 NOTE — PROGRESS NOTES
Chief Complaint   Patient presents with    Follow-up Chronic Condition    Congestion    Fever     diarrhea    /83 (Site: Right Upper Arm, Position: Sitting, Cuff Size: Medium Adult)   Pulse 73   Temp 98.2 °F (36.8 °C) (Oral) Comment: took tylenol  Ht 1.905 m (6' 3\")   Wt 84.8 kg (187 lb)   SpO2 98%   BMI 23.37 kg/m²    \"Have you been to the ER, urgent care clinic since your last visit?  Hospitalized since your last visit?\"    NO    “Have you seen or consulted any other health care providers outside our system since your last visit?”    Yes [facility] for [facility]

## 2025-01-29 ENCOUNTER — TELEPHONE (OUTPATIENT)
Age: 35
End: 2025-01-29

## 2025-02-26 PROBLEM — Z01.89 ENCOUNTER FOR ROUTINE LABORATORY TESTING: Status: RESOLVED | Noted: 2025-01-27 | Resolved: 2025-02-26

## 2025-07-23 ENCOUNTER — TELEPHONE (OUTPATIENT)
Age: 35
End: 2025-07-23

## 2025-07-23 NOTE — TELEPHONE ENCOUNTER
Contacted patient to confirm NP appt scheduled for tomorrow with Dr. Bassett. Patient states he went back to [facility] [place] and was tested and told that he was clear everything is ok. He states he no longer needs this appointment and requested to cancel.

## (undated) DEVICE — FORCEPS BX L240CM JAW DIA2.8MM L CAP W/ NDL MIC MESH TOOTH

## (undated) DEVICE — TUBING IRRIG COMPATIBLE W ERBE MEDIVATOR PMP HYDR